# Patient Record
Sex: MALE | Race: WHITE
[De-identification: names, ages, dates, MRNs, and addresses within clinical notes are randomized per-mention and may not be internally consistent; named-entity substitution may affect disease eponyms.]

---

## 2020-06-08 ENCOUNTER — HOSPITAL ENCOUNTER (OUTPATIENT)
Dept: HOSPITAL 96 - M.LAB | Age: 50
End: 2020-06-08
Attending: INTERNAL MEDICINE
Payer: OTHER GOVERNMENT

## 2020-06-08 DIAGNOSIS — Z11.59: Primary | ICD-10-CM

## 2020-06-08 DIAGNOSIS — Z12.11: ICD-10-CM

## 2020-07-05 NOTE — CON
[address]  [address]                    CONSULTATION                  
_______________________________________________________________________________
 
Name:       KOKI MCGEE               Room:                      REG CLI 
M.R.#:  [id number]      Account #:      [id number]  
Admission:  06/08/20     Attend Phys:    Jad Mahoney MD
Discharge:               Date of Birth:  [birth year]  
         Report #: [id number]
                                                                     [id number]  
_______________________________________________________________________________
THIS REPORT FOR:  //name//                      
 
cc:  Woody Sánchez MD, Khanh MD                                                      ~
THIS REPORT FOR:  //name//                      
 
CC: Woody Mahoney
 
DATE OF SERVICE:  06/15/2020
 
 
HISTORY OF PRESENT ILLNESS:  This is a pleasant 50-year-old gentleman with past
medical history significant for hyperlipidemia, who is presenting for average
risk screening examination.  The patient denies any abdominal pain, nausea,
vomiting, diarrhea, hematemesis or hematochezia.
 
PAST MEDICAL HISTORY:  Hyperlipidemia, basal cell cancer.
 
PAST SURGICAL HISTORY:  Nonsignificant.
 
SOCIAL HISTORY:  The patient reports drinking, taking 3 drinks per week; quit
smoking in the past; denies recreational drug use.
 
FAMILY HISTORY:  No family history of colon cancer or Fel[place]-related neoplasia.
 
REVIEW OF SYSTEMS:  Comprehensive 10-point review of systems negative except for
what is mentioned in the HPI.
 
PHYSICAL EXAMINATION:
GENERAL:  The patient is alert, awake, oriented x 3.
HEENT:  Pupils are equal, round, reactive to light and accommodation.  Mucous
membranes are moist.  There is no congestion.
LUNGS:  Clear to auscultation bilaterally.
CARDIOVASCULAR:  Rate and rhythm regular.  S1, S2 present.
ABDOMEN:  Soft.  There is no distention, guarding or rigidity.
EXTREMITIES:  Warm and well perfused.
 
OPERATIVE REPORT:  The adult colonoscope was introduced through the anus into
the cecum without any significant difficulty.  The quality of preparation was
excellent.  The colon was normal on direct and retroflexed views.  The
instrument withdrawal time was 10 minutes.
 
 
 
 
[place] [address]                    CONSULTATION                  
_______________________________________________________________________________
 
Name:       KOKI MCGEE               Room:                      REG CLI 
M.R.#:  [id number]      Account #:      [id number]  
Admission:  06/08/20     Attend Phys:    Jad Mahoney MD
Discharge:               Date of Birth:  [birth year]  
         Report #: [id number]
                                                                     [id number]  
_______________________________________________________________________________
RECOMMENDATIONS:  Follow up pathology results.  Resume previous diet and
medications today.  Return to normal activities tomorrow.
 
 
 
 
 
 
 
 
 
 
 
 
 
 
 
 
 
 
 
 
 
 
 
 
 
 
 
 
 
 
 
 
 
 
 
 
 
 
 
 
 
 
<ELECTRONICALLY SIGNED>
                                        By:  Jad Mahoney MD         
07/05/20     0828
D: 06/15/20 0826_______________________________________
T: 06/15/20 0851Jad Mahoney MD            /nt